# Patient Record
Sex: FEMALE | URBAN - NONMETROPOLITAN AREA
[De-identification: names, ages, dates, MRNs, and addresses within clinical notes are randomized per-mention and may not be internally consistent; named-entity substitution may affect disease eponyms.]

---

## 2020-05-07 ENCOUNTER — APPOINTMENT (RX ONLY)
Dept: URBAN - NONMETROPOLITAN AREA CLINIC 27 | Facility: CLINIC | Age: 55
Setting detail: DERMATOLOGY
End: 2020-05-07

## 2020-05-07 VITALS — TEMPERATURE: 99 F

## 2020-05-07 DIAGNOSIS — B35.4 TINEA CORPORIS: ICD-10-CM

## 2020-05-07 DIAGNOSIS — Z00.8 ENCOUNTER FOR OTHER GENERAL EXAMINATION: ICD-10-CM

## 2020-05-07 PROBLEM — F32.9 MAJOR DEPRESSIVE DISORDER, SINGLE EPISODE, UNSPECIFIED: Status: ACTIVE | Noted: 2020-05-07

## 2020-05-07 PROCEDURE — 87220 TISSUE EXAM FOR FUNGI: CPT

## 2020-05-07 PROCEDURE — ? OTHER

## 2020-05-07 PROCEDURE — ? TREATMENT REGIMEN

## 2020-05-07 PROCEDURE — ? KOH PREP

## 2020-05-07 PROCEDURE — ? COUNSELING

## 2020-05-07 PROCEDURE — 99202 OFFICE O/P NEW SF 15 MIN: CPT

## 2020-05-07 PROCEDURE — ? PRESCRIPTION

## 2020-05-07 RX ORDER — KETOCONAZOLE 20 MG/G
CREAM TOPICAL
Qty: 1 | Refills: 0 | Status: ERX | COMMUNITY
Start: 2020-05-07

## 2020-05-07 RX ADMIN — KETOCONAZOLE: 20 CREAM TOPICAL at 00:00

## 2020-05-07 ASSESSMENT — LOCATION SIMPLE DESCRIPTION DERM: LOCATION SIMPLE: LEFT HAND

## 2020-05-07 ASSESSMENT — LOCATION DETAILED DESCRIPTION DERM: LOCATION DETAILED: LEFT RADIAL DORSAL HAND

## 2020-05-07 ASSESSMENT — LOCATION ZONE DERM: LOCATION ZONE: HAND

## 2020-05-07 NOTE — HPI: RASH
How Severe Is Your Rash?: mild
Is This A New Presentation, Or A Follow-Up?: Referral for Rash
Who Is Your Referring Provider?: Kelly Olguin NP